# Patient Record
(demographics unavailable — no encounter records)

---

## 2017-10-02 NOTE — PHYS DOC
Past Medical History


Past Medical History:  No Pertinent History


Past Surgical History:  Other


Additional Past Surgical Histo:  Bilateral inguinal hernia.


Alcohol Use:  Occasionally


Drug Use:  None





Adult General


Chief Complaint


Chief Complaint:  ANKLE PROBLEM





HPI


HPI





Patient is a 28  year old female presents to the emergency department stating 

that she rolled her left ankle yesterday. She states that she is having pain on 

the left lateral part of the ankle. Patient has been able to ambulate however 

she did state she was at work today and had some numbness and tingling down to 

her toe. She states that she stands and has to boxed things. She is taken 

ibuprofen 800 mg at noon today. At the current time she denies any numbness or 

tingling in the foot area. Peripheral pulses 2+ cap refill brisk less than 2 

seconds good sensation noted.





Review of Systems


Review of Systems





Constitutional: Denies fever or chills []


Eyes: Denies change in visual acuity, redness, or eye pain []


HENT: Denies nasal congestion or sore throat []


Respiratory: Denies cough or shortness of breath []


Cardiovascular: No additional information not addressed in HPI []


GI: Denies abdominal pain, nausea, vomiting, bloody stools or diarrhea []


: Denies dysuria or hematuria []


Musculoskeletal: Denies back pain. Complaint of left ankle pain and discomfort.


Integument: Denies rash or skin lesions []


Neurologic: Denies headache, focal weakness or sensory changes []


Endocrine: Denies polyuria or polydipsia []





Current Medications


Current Medications





Current Medications








 Medications


  (Trade)  Dose


 Ordered  [facility]  Start Time


 Stop Time Status Last Admin


Dose Admin


 


 Ibuprofen


  (Motrin)  800 mg  1X  ONCE  10/2/17 20:15


 10/2/17 20:18 DC  


 











Allergies


Allergies





Allergies








Coded Allergies Type Severity Reaction Last Updated Verified


 


  No Known Drug Allergies    6/21/17 No











Physical Exam


Physical Exam





Constitutional: Well developed, well nourished, no acute distress, non-toxic 

appearance. []


HENT: Normocephalic, atraumatic, bilateral external ears normal, oropharynx 

moist, no oral exudates, nose normal. []


Eyes: PERRLA, EOMI, conjunctiva normal, no discharge. [] 


Neck: Normal range of motion, no tenderness, supple, no stridor. [] 


Cardiovascular:Heart rate regular rhythm


Lungs & Thorax: No respiratory distress noted


Skin: Warm, dry, no erythema, no rash. [] ] 


Extremities: Left lateral ankle tenderness, no cyanosis, no clubbing, ROM intact

, no edema. Slight swelling noted no discoloration no redness noted tenderness 

noted as mentioned. Peripheral pulses 2+ cap refill brisk less than 2 seconds. 

Good sensation noted to the toes patient is able to move the toes without 

difficulty.


Neurologic: Alert and oriented X 3, normal motor function, normal sensory 

function, no focal deficits noted. []


Psychologic: Affect normal, judgement normal, mood normal. []





Current Patient Data


Vital Signs





 Vital Signs








  Date Time  Temp Pulse Resp B/P (MAP) Pulse Ox O2 Delivery O2 Flow Rate FiO2


 


10/2/17 20:00 98.3 88 16  99 Room Air  





 98.3       











EKG


EKG


[]





Radiology/Procedures


Radiology/Procedures


[]





Course & Med Decision Making


Course & Med Decision Making


Pertinent Labs and Imaging studies reviewed. (See chart for details)


X-rays were negative for any bony abnormalities per Dr. Solo. Patient will 

be placed in the Air-Stirrup splint with Ace wrap. Recommended ibuprofen 800 mg 

every 8 hours. Also recommended ice packs on 20 minutes off 20 minutes several 

times a day elevation as much as possible. She'll be provided with orthopedic 

name and number to follow up with if she continues to have pain and discomfort. 

Patient is to wear the Ace wrap for the next 5-7 days in the Air-Stirrup splint 

for the next 7-10 days. Patient was provided with signs and symptoms to return 

back to emergency department. All questions and concerns been answered at 

patient's bedside.


[]





Dragon Disclaimer


Dragon Disclaimer


This electronic medical record was generated, in whole or in part, using a 

voice recognition dictation system.





Departure


Departure


Impression:  


 Primary Impression:  


 Left ankle sprain


Disposition:  01 HOME, SELF-CARE


Condition:  STABLE


Referrals:  


NO PCP (PCP)








JERRICA MICHAEL MD


Patient Instructions:  Ankle Sprain, Easy-to-Read





Additional Instructions:  


Your x-rays are negative for any bony abnormalities.


Wear the Ace wrap for the next 5-7 days.


Wear the Air-Stirrup splint for the next 7-10 days.


Ibuprofen 800 mg every 8 hours to help with pain and inflammation. Take this 

medication with food to prevent stomach upset.


Ice packs on 20 minutes off 20 minutes several times a day.


Elevation as much as possible.


Follow-up with orthopedic as needed if you're pain continues longer than a week.


Return back to emergency department for signs and symptoms of become worse.





Problem Qualifiers








 Primary Impression:  


 Left ankle sprain


 Encounter type:  initial encounter  Involved ligament of ankle:  unspecified 

ligament  Qualified Codes:  S93.402A - Sprain of unspecified ligament of left 

ankle, initial encounter








DEIDRE GOSS APRN Oct 2, 2017 20:12

## 2017-10-03 NOTE — RAD
Left ankle, 3 views, 10/2/2017:



History: Twisting injury, pain



Small calcific densities at the tips of the medial and lateral malleoli are

well corticated and are probably old. No definite acute fracture or

dislocation is identified. There is mild soft tissue swelling over the lateral

malleolus.



IMPRESSION: No acute bony abnormality is detected.
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
## 2017-12-03 NOTE — PHYS DOC
Past Medical History


Past Medical History:  No Pertinent History


Past Surgical History:  Other


Additional Past Surgical Histo:  Bilateral inguinal hernia.


Alcohol Use:  Occasionally


Drug Use:  None





Adult General


Chief Complaint


Chief Complaint:  EARACHE/EAR PAIN





HPI


HPI





Patient is a 28  year old E male presents to the emergency department with 

bilateral ear pain. Patient states she was evaluated in the emergency 

department one week ago and diagnosed with upper respiratory infection placed 

on prednisone. She states she felt well while she was on the prednisone and has 

been off prednisone now for one day. She states that she has bilateral ear 

pain. No fever. No cough. No sore throat.





Review of Systems


Review of Systems





Constitutional: Denies fever or chills []


Eyes: Denies change in visual acuity, redness, or eye pain []


HENT: Denies nasal congestion or sore throat , complaining of bilateral ear pain

[]


Respiratory: Denies cough or shortness of breath []


Cardiovascular: No additional information not addressed in HPI []


GI: Denies abdominal pain, nausea, vomiting, bloody stools or diarrhea []


: Denies dysuria or hematuria []


Musculoskeletal: Denies back pain or joint pain []


Integument: Denies rash or skin lesions []


Neurologic: Denies headache, focal weakness or sensory changes []


Endocrine: Denies polyuria or polydipsia []





All other systems were reviewed and found to be within normal limits, except as 

documented in this note.





Allergies


Allergies





Allergies








Coded Allergies Type Severity Reaction Last Updated Verified


 


  No Known Drug Allergies    6/21/17 No











Physical Exam


Physical Exam





Constitutional: Well developed, well nourished, no acute distress, non-toxic 

appearance. []


HENT: Normocephalic, atraumatic, bilateral external ears normal, left tympanic 

membranes retracted, right tympanic membrane with effusion, clear fluid, 

oropharynx moist, no oral exudates, nose normal. []


Eyes: PERRLA, EOMI, conjunctiva normal, no discharge. [] 


Neck: Normal range of motion, no tenderness, supple, no stridor. [] 


Cardiovascular:Heart rate regular rhythm, no murmur []


Lungs & Thorax:  Bilateral breath sounds clear to auscultation []


Abdomen: Bowel sounds normal, soft, no tenderness, no masses, no pulsatile 

masses. [] 


Skin: Warm, dry, no erythema, no rash. [] 


Back: No tenderness, no CVA tenderness. [] 


Extremities: No tenderness, no cyanosis, no clubbing, ROM intact, no edema. [] 


Neurologic: Alert and oriented X 3, normal motor function, normal sensory 

function, no focal deficits noted. []


Psychologic: Affect normal, judgement normal, mood normal. []





EKG


EKG


[]





Radiology/Procedures


Radiology/Procedures


[]





Course & Med Decision Making


Course & Med Decision Making


Pertinent Labs and Imaging studies reviewed. (See chart for details)





[]





Dragon Disclaimer


Dragon Disclaimer


This electronic medical record was generated, in whole or in part, using a 

voice recognition dictation system.





Departure


Departure


Impression:  


 Primary Impression:  


 Eustachian tube dysfunction


Disposition:  01 HOME, SELF-CARE


Condition:  STABLE


Referrals:  


NO PCP (PCP)








[facility]


Patient Instructions:  Otalgia


Scripts


Pseudoephedrine Hcl (SUDAFED 12-HOUR) 120 Mg Tablet.er


1 TAB PO BID, #20 TAB


   Prov: LISA LIZAMA         12/3/17 


Fluticasone Propionate (Flonase Allergy Relief) 9.9 Ml Hunker.susp


2 SPRAYS NS DAILY, #1 BOTTLE


   Prov: LISA LIZAMA         12/3/17





Problem Qualifiers








 Primary Impression:  


 Eustachian tube dysfunction


 Laterality:  bilateral  Qualified Codes:  H69.83 - Other specified disorders 

of eustachian tube, bilateral








LISA LIZAMA Dec 3, 2017 21:15
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
